# Patient Record
Sex: FEMALE | Race: WHITE
[De-identification: names, ages, dates, MRNs, and addresses within clinical notes are randomized per-mention and may not be internally consistent; named-entity substitution may affect disease eponyms.]

---

## 2021-08-23 ENCOUNTER — HOSPITAL ENCOUNTER (OUTPATIENT)
Dept: HOSPITAL 79 - ER1 | Age: 71
Setting detail: OBSERVATION
LOS: 3 days | Discharge: HOME | End: 2021-08-26
Attending: INTERNAL MEDICINE | Admitting: INTERNAL MEDICINE
Payer: MEDICARE

## 2021-08-23 VITALS — BODY MASS INDEX: 48.07 KG/M2 | WEIGHT: 261.25 LBS | HEIGHT: 62 IN

## 2021-08-23 DIAGNOSIS — Z79.899: ICD-10-CM

## 2021-08-23 DIAGNOSIS — I12.9: ICD-10-CM

## 2021-08-23 DIAGNOSIS — N30.00: ICD-10-CM

## 2021-08-23 DIAGNOSIS — R53.81: ICD-10-CM

## 2021-08-23 DIAGNOSIS — Z66: ICD-10-CM

## 2021-08-23 DIAGNOSIS — Z79.4: ICD-10-CM

## 2021-08-23 DIAGNOSIS — Z88.1: ICD-10-CM

## 2021-08-23 DIAGNOSIS — E78.5: ICD-10-CM

## 2021-08-23 DIAGNOSIS — E11.65: ICD-10-CM

## 2021-08-23 DIAGNOSIS — Z20.822: ICD-10-CM

## 2021-08-23 DIAGNOSIS — E66.01: ICD-10-CM

## 2021-08-23 DIAGNOSIS — E86.0: ICD-10-CM

## 2021-08-23 DIAGNOSIS — N17.9: Primary | ICD-10-CM

## 2021-08-23 DIAGNOSIS — E11.22: ICD-10-CM

## 2021-08-23 DIAGNOSIS — Z79.01: ICD-10-CM

## 2021-08-23 DIAGNOSIS — I48.0: ICD-10-CM

## 2021-08-23 DIAGNOSIS — E11.40: ICD-10-CM

## 2021-08-23 DIAGNOSIS — J45.909: ICD-10-CM

## 2021-08-23 DIAGNOSIS — E87.6: ICD-10-CM

## 2021-08-23 DIAGNOSIS — N18.30: ICD-10-CM

## 2021-08-23 LAB
HGB BLD-MCNC: 11.7 GM/DL (ref 12.3–15.3)
RED BLOOD COUNT: 4.05 M/UL (ref 4–5.1)
WHITE BLOOD COUNT: 11.8 K/UL (ref 4.5–11)

## 2021-08-23 PROCEDURE — U0002 COVID-19 LAB TEST NON-CDC: HCPCS

## 2021-08-23 PROCEDURE — G0378 HOSPITAL OBSERVATION PER HR: HCPCS

## 2021-08-24 LAB
BUN/CREATININE RATIO: 12 (ref 0–10)
BUN/CREATININE RATIO: 13 (ref 0–10)

## 2021-08-25 LAB
BUN/CREATININE RATIO: 13 (ref 0–10)
HGB BLD-MCNC: 9.7 GM/DL (ref 12.3–15.3)
RED BLOOD COUNT: 3.41 M/UL (ref 4–5.1)
WHITE BLOOD COUNT: 8 K/UL (ref 4.5–11)

## 2021-08-26 LAB
BUN/CREATININE RATIO: 14 (ref 0–10)
HGB BLD-MCNC: 10 GM/DL (ref 12.3–15.3)
RED BLOOD COUNT: 3.48 M/UL (ref 4–5.1)
WHITE BLOOD COUNT: 7.1 K/UL (ref 4.5–11)

## 2021-09-11 ENCOUNTER — HOSPITAL ENCOUNTER (INPATIENT)
Dept: HOSPITAL 79 - ER1 | Age: 71
LOS: 6 days | Discharge: HOME HEALTH | DRG: 674 | End: 2021-09-17
Attending: STUDENT IN AN ORGANIZED HEALTH CARE EDUCATION/TRAINING PROGRAM | Admitting: INTERNAL MEDICINE
Payer: MEDICARE

## 2021-09-11 VITALS — BODY MASS INDEX: 43.43 KG/M2 | WEIGHT: 236 LBS | HEIGHT: 62 IN

## 2021-09-11 DIAGNOSIS — B95.2: ICD-10-CM

## 2021-09-11 DIAGNOSIS — Z20.822: ICD-10-CM

## 2021-09-11 DIAGNOSIS — E11.40: ICD-10-CM

## 2021-09-11 DIAGNOSIS — E78.5: ICD-10-CM

## 2021-09-11 DIAGNOSIS — E46: ICD-10-CM

## 2021-09-11 DIAGNOSIS — K31.819: ICD-10-CM

## 2021-09-11 DIAGNOSIS — E11.22: ICD-10-CM

## 2021-09-11 DIAGNOSIS — Z82.49: ICD-10-CM

## 2021-09-11 DIAGNOSIS — Z80.8: ICD-10-CM

## 2021-09-11 DIAGNOSIS — Z98.51: ICD-10-CM

## 2021-09-11 DIAGNOSIS — N13.6: Primary | ICD-10-CM

## 2021-09-11 DIAGNOSIS — K59.00: ICD-10-CM

## 2021-09-11 DIAGNOSIS — Z98.890: ICD-10-CM

## 2021-09-11 DIAGNOSIS — N17.9: ICD-10-CM

## 2021-09-11 DIAGNOSIS — Z90.710: ICD-10-CM

## 2021-09-11 DIAGNOSIS — I48.0: ICD-10-CM

## 2021-09-11 DIAGNOSIS — Z79.01: ICD-10-CM

## 2021-09-11 DIAGNOSIS — D63.1: ICD-10-CM

## 2021-09-11 DIAGNOSIS — K22.4: ICD-10-CM

## 2021-09-11 DIAGNOSIS — E66.01: ICD-10-CM

## 2021-09-11 DIAGNOSIS — Z90.49: ICD-10-CM

## 2021-09-11 DIAGNOSIS — Z88.8: ICD-10-CM

## 2021-09-11 DIAGNOSIS — I12.9: ICD-10-CM

## 2021-09-11 DIAGNOSIS — Z83.3: ICD-10-CM

## 2021-09-11 DIAGNOSIS — K29.70: ICD-10-CM

## 2021-09-11 DIAGNOSIS — N18.30: ICD-10-CM

## 2021-09-11 DIAGNOSIS — K29.80: ICD-10-CM

## 2021-09-11 DIAGNOSIS — J45.909: ICD-10-CM

## 2021-09-11 DIAGNOSIS — B96.20: ICD-10-CM

## 2021-09-11 LAB
BUN/CREATININE RATIO: 16 (ref 0–10)
HGB BLD-MCNC: 11.3 GM/DL (ref 12.3–15.3)
RED BLOOD COUNT: 4.14 M/UL (ref 4–5.1)
WHITE BLOOD COUNT: 8.6 K/UL (ref 4.5–11)

## 2021-09-11 PROCEDURE — C9113 INJ PANTOPRAZOLE SODIUM, VIA: HCPCS

## 2021-09-11 PROCEDURE — U0002 COVID-19 LAB TEST NON-CDC: HCPCS

## 2021-09-12 LAB
BUN/CREATININE RATIO: 17 (ref 0–10)
HGB BLD-MCNC: 10.4 GM/DL (ref 12.3–15.3)
RED BLOOD COUNT: 3.82 M/UL (ref 4–5.1)
WHITE BLOOD COUNT: 6 K/UL (ref 4.5–11)

## 2021-09-13 LAB
BUN/CREATININE RATIO: 16 (ref 0–10)
HGB BLD-MCNC: 10.5 GM/DL (ref 12.3–15.3)
RED BLOOD COUNT: 3.79 M/UL (ref 4–5.1)
WHITE BLOOD COUNT: 5.5 K/UL (ref 4.5–11)

## 2021-09-14 LAB
BUN/CREATININE RATIO: 17 (ref 0–10)
HGB BLD-MCNC: 9.6 GM/DL (ref 12.3–15.3)
RED BLOOD COUNT: 3.51 M/UL (ref 4–5.1)
WHITE BLOOD COUNT: 5.2 K/UL (ref 4.5–11)

## 2021-09-15 LAB
BUN/CREATININE RATIO: 16 (ref 0–10)
HGB BLD-MCNC: 10.3 GM/DL (ref 12.3–15.3)
RED BLOOD COUNT: 3.78 M/UL (ref 4–5.1)
WHITE BLOOD COUNT: 5.1 K/UL (ref 4.5–11)

## 2021-09-16 LAB
BUN/CREATININE RATIO: 16 (ref 0–10)
HGB BLD-MCNC: 10.2 GM/DL (ref 12.3–15.3)
RED BLOOD COUNT: 3.74 M/UL (ref 4–5.1)
WHITE BLOOD COUNT: 5.4 K/UL (ref 4.5–11)

## 2021-09-16 PROCEDURE — 07BC3ZX EXCISION OF PELVIS LYMPHATIC, PERCUTANEOUS APPROACH, DIAGNOSTIC: ICD-10-PCS | Performed by: RADIOLOGY

## 2021-09-16 PROCEDURE — 0DB78ZX EXCISION OF STOMACH, PYLORUS, VIA NATURAL OR ARTIFICIAL OPENING ENDOSCOPIC, DIAGNOSTIC: ICD-10-PCS | Performed by: INTERNAL MEDICINE

## 2021-09-17 LAB
BUN/CREATININE RATIO: 15 (ref 0–10)
HGB BLD-MCNC: 10.4 GM/DL (ref 12.3–15.3)
RED BLOOD COUNT: 3.72 M/UL (ref 4–5.1)
WHITE BLOOD COUNT: 5.1 K/UL (ref 4.5–11)

## 2021-10-26 ENCOUNTER — OFFICE VISIT (OUTPATIENT)
Dept: UROLOGY | Facility: CLINIC | Age: 71
End: 2021-10-26

## 2021-10-26 VITALS — HEIGHT: 62 IN

## 2021-10-26 DIAGNOSIS — C54.1 ENDOMETRIAL CANCER DETERMINED BY UTERINE BIOPSY (HCC): ICD-10-CM

## 2021-10-26 DIAGNOSIS — Z87.440 HISTORY OF RECURRENT UTIS: ICD-10-CM

## 2021-10-26 DIAGNOSIS — R19.00 PELVIC MASS IN FEMALE: ICD-10-CM

## 2021-10-26 DIAGNOSIS — N13.30 HYDRONEPHROSIS OF LEFT KIDNEY: Primary | ICD-10-CM

## 2021-10-26 DIAGNOSIS — R10.9 CHRONIC LEFT FLANK PAIN: ICD-10-CM

## 2021-10-26 DIAGNOSIS — G89.29 CHRONIC LEFT FLANK PAIN: ICD-10-CM

## 2021-10-26 PROCEDURE — 99204 OFFICE O/P NEW MOD 45 MIN: CPT | Performed by: NURSE PRACTITIONER

## 2021-10-26 RX ORDER — METOPROLOL SUCCINATE 25 MG/1
25 TABLET, EXTENDED RELEASE ORAL DAILY
COMMUNITY

## 2021-10-26 RX ORDER — LOSARTAN POTASSIUM AND HYDROCHLOROTHIAZIDE 25; 100 MG/1; MG/1
1 TABLET ORAL DAILY
COMMUNITY
End: 2021-11-02

## 2021-10-26 RX ORDER — OXYBUTYNIN CHLORIDE 5 MG/1
5 TABLET ORAL 2 TIMES DAILY
COMMUNITY

## 2021-10-26 RX ORDER — MONTELUKAST SODIUM 10 MG/1
10 TABLET ORAL NIGHTLY
COMMUNITY

## 2021-10-26 RX ORDER — PANTOPRAZOLE SODIUM 40 MG/1
40 TABLET, DELAYED RELEASE ORAL DAILY
COMMUNITY

## 2021-10-26 RX ORDER — ROSUVASTATIN CALCIUM 20 MG/1
20 TABLET, COATED ORAL DAILY
COMMUNITY

## 2021-10-26 RX ORDER — FUROSEMIDE 20 MG/1
20 TABLET ORAL 2 TIMES DAILY
COMMUNITY
End: 2021-11-02

## 2021-10-26 RX ORDER — GENTAMICIN SULFATE 80 MG/100ML
80 INJECTION, SOLUTION INTRAVENOUS ONCE
Status: CANCELLED | OUTPATIENT
Start: 2021-11-04 | End: 2021-10-26

## 2021-10-26 RX ORDER — APIXABAN 5 MG/1
5 TABLET, FILM COATED ORAL 2 TIMES DAILY
COMMUNITY
Start: 2021-10-22

## 2021-10-26 NOTE — PROGRESS NOTES
Chief Complaint  LEFT HYDRONEPHROSIS /ENDOMETRIAL CANCER (New Patient WITH LEFT LATERAL PELVIC MASS CAUSEING HYDRONEPHROSI/NEEDS A LEFT STENT PLACED) and Nodule on kidney    Subjective          Delmy Bassett presents to [facility]  History of Present Illness     Ms. Delmy Bassett is a very pleasant but frail and ill-looking 71-year-old female with a significant history of recurrent Endometrial Cancer diagnosed 2 years ago, who presents to clinic today for evaluation.  She has been referred to us for left ureteral stent placement, due to severe left hydronephrosis, secondary to extrinsic compression of the ureter by an enlarged left iliac lymph node and a left periaortic node.    The patient is being followed by Dr. Zenaida Oseguera [facility], where she was recently hospitalized from O9/11/2021 through 09/14/2021 due to uncontrolled nausea vomiting generalized malaise,  during which a recent CT of abdomen on September 11 showed a new left lateral pelvic enlarged lymph miky mass,  resulting in compression of the ureter. She underwent CT-guided biopsy of her left lateral pelvic miky mass with pathology results currently pending.    On exam in clinic today, she is in apparent discomfort, she is weak but desirous of surgical intervention she states.  She reports she apparently had been reluctant for any aggressive treatment with chemotherapy in the past when she was first diagnosed 2 years ago.  Since its recurrence, she reports she has had multiple discussions with different options most likely radiation therapy if the tumor is more localized, or a hormonal pill, but she remains afraid she already has some lymph node involvement she states.  Nevertheless she is very hopeful with positives.    The patient is unable to give us any urine sample today.  However, she currently denies any urinary symptoms of frequency urgency or dysuria, she denies any burning on  "urination, but reports pelvic pressure and suprapubic comfort, she has lower abdominal pain, lower back pain, flank pain, she denies any CVA tenderness.  She denies any episodes of gross hematuria, and is currently on no antibiotics for her bladder.  PVR is 62 cc.    Objective   Vital Signs:   Ht 157.5 cm (62\")     Physical Exam  Constitutional:       General: She is in acute distress.      Appearance: She is well-developed. She is ill-appearing.   HENT:      Head: Normocephalic and atraumatic.   Eyes:      Pupils: Pupils are equal, round, and reactive to light.   Neck:      Thyroid: No thyromegaly.      Trachea: No tracheal deviation.   Cardiovascular:      Rate and Rhythm: Normal rate and regular rhythm.      Heart sounds: No murmur heard.      Pulmonary:      Effort: Pulmonary effort is normal. No respiratory distress.      Breath sounds: Normal breath sounds. No stridor. No wheezing.   Abdominal:      General: Bowel sounds are normal. There is distension.      Palpations: Abdomen is soft.      Tenderness: There is abdominal tenderness. There is guarding.   Genitourinary:     Labia:         Right: No tenderness.         Left: No tenderness.       Vagina: Normal. No vaginal discharge.      Comments: Pelvic exam deferred patient unable to tolerate  Musculoskeletal:         General: Tenderness present. No deformity. Normal range of motion.      Cervical back: Normal range of motion.   Skin:     General: Skin is warm and dry.      Capillary Refill: Capillary refill takes less than 2 seconds.      Coloration: Skin is pale.      Findings: No erythema or rash.   Neurological:      Mental Status: She is alert and oriented to person, place, and time.      Cranial Nerves: No cranial nerve deficit.      Sensory: No sensory deficit.      Coordination: Coordination normal.   Psychiatric:         Behavior: Behavior normal.         Thought Content: Thought content normal.         Judgment: Judgment normal.      Comments: " Pleasant with positive spirits        Result Review :                 Assessment and Plan    Diagnoses and all orders for this visit:    1. Hydronephrosis of left kidney (Primary)  -     COVID PRE-OP / PRE-PROCEDURE SCREENING ORDER (NO ISOLATION) - Swab, Nasopharynx; Future  -     Case Request; Standing  -     CBC (No Diff); Future  -     Basic Metabolic Panel; Future  -     Case Request    2. Endometrial cancer determined by uterine biopsy (HCC)  -     COVID PRE-OP / PRE-PROCEDURE SCREENING ORDER (NO ISOLATION) - Swab, Nasopharynx; Future  -     Case Request; Standing  -     CBC (No Diff); Future  -     Basic Metabolic Panel; Future  -     Case Request    3. Chronic left flank pain  -     COVID PRE-OP / PRE-PROCEDURE SCREENING ORDER (NO ISOLATION) - Swab, Nasopharynx; Future  -     Case Request; Standing  -     CBC (No Diff); Future  -     Basic Metabolic Panel; Future  -     Case Request    4. History of recurrent UTIs  -     COVID PRE-OP / PRE-PROCEDURE SCREENING ORDER (NO ISOLATION) - Swab, Nasopharynx; Future  -     Case Request; Standing  -     CBC (No Diff); Future  -     Basic Metabolic Panel; Future  -     Case Request    5. Pelvic mass in female    Other orders  -     Follow Anesthesia Guidelines / Protocol; Future  -     Obtain Informed Consent; Future  -     Provide NPO Instructions to Patient; Future  -     Chlorhexidine Skin Prep; Future          ASSESSMENT/PLAN  ENDOMETRIAL CA/ LEFT FLANK/LOWER BACK PAIN LEFT hydronephrosis and hydroureter: Patient presents to clinic today for evaluation of left hydronephrosis and possible placement of a left stent.  She had a CT-with guided biopsy at [facility] due to pelvic mass causing compression.     We both reviewed and discussed her CT scan reports , We discussed Hydronephrosis and hydroureter. I explained  Hydronephrosis as the swelling of a kidney due to a build-up of urine. It happens when urine cannot drain out from the kidney to the bladder  from a blockage or obstruction. Hydronephrosis can occur in one or both kidneys.      This can be caused by an anatomical defect in some cases or cancer that doesn't allow urine to drain properly, it could be a congenital blockage, a kidney stone, ETC. I explained hydroureter as a dilation of the ureter, and that both the presence of hydronephrosis or hydroureter can be physiologic or pathologic. It may be acute or chronic, unilateral or bilateral.     I discussed her plan of care with Dr. VIVAR and he is agreeable plan of care.      She has been scheduled for Cystoscopy, Ureteroscopy with LEFT STENT INSERTION procedure in OR on 11/4/2021.    She is to continue all medications as prescribed by PCP/oncology    She will continue have follow-up care with oncology as recommended.    Patient/daughter agreeable to plan of care.     Follow Up   Return in about 9 days (around 11/4/2021) for Next scheduled follow up IN OR WITH , DR VIVAR FOR LEFT URETERAL STENT INSERTION.     Patient was given instructions and counseling regarding her condition or for health maintenance advice. Please see specific information pulled into the AVS if appropriate.

## 2021-10-29 NOTE — PATIENT INSTRUCTIONS
Hydronephrosis    Hydronephrosis is the swelling of one or both kidneys due to a blockage that stops urine from flowing out of the body. Kidneys filter waste from the blood and produce urine. This condition can lead to kidney failure and may become life-threatening if not treated promptly.  What are the causes?  In infants and children, common causes include problems that occur when a baby is developing in the womb. These can include problems in the kidneys or in the tubes that drain urine into the bladder (ureters).  In adults, common causes include:  · Kidney stones.  · Pregnancy.  · A tumor or cyst in the abdomen or pelvis.  · An enlarged prostate gland.  Other causes include:  · Bladder infection.  · Scar tissue from a previous surgery or injury.  · A blood clot.  · Cancer of the prostate, bladder, uterus, ovary, or colon.  What are the signs or symptoms?  Symptoms of this condition include:  · Pain or discomfort in your side (flank) or abdomen.  · Swelling in your abdomen.  · Nausea and vomiting.  · Fever.  · Pain when passing urine.  · Feelings of urgency when you need to urinate.  · Urinating more often than normal.  In some cases, you may not have any symptoms.  How is this diagnosed?  This condition may be diagnosed based on:  · Your symptoms and medical history.  · A physical exam.  · Blood and urine tests.  · Imaging tests, such as an ultrasound, CT scan, or MRI.  · A procedure to look at your urinary tract and bladder by inserting a scope into the urethra (cystoscopy).  How is this treated?  Treatment for this condition depends on where the blockage is, how long it has been there, and what caused it. The goal of treatment is to remove the blockage. Treatment may include:  · Antibiotic medicines to treat or prevent infection.  · A procedure to place a small, thin tube (stent) into a blocked ureter. The stent will keep the ureter open so that urine can drain through it.  · A nonsurgical procedure that  crushes kidney stones with shock waves (extracorporeal shock wave lithotripsy).  · If kidney failure occurs, treatment may include dialysis or a kidney transplant.  Follow these instructions at home:    · Take over-the-counter and prescription medicines only as told by your health care provider.  · If you were prescribed an antibiotic medicine, take it exactly as told by your health care provider. Do not stop taking the antibiotic even if you start to feel better.  · Rest and return to your normal activities as told by your health care provider. Ask your health care provider what activities are safe for you.  · Drink enough fluid to keep your urine pale yellow.  · Keep all follow-up visits. This is important.  Contact a health care provider if:  · You continue to have symptoms after treatment.  · You develop new symptoms.  · Your urine becomes cloudy or bloody.  · You have a fever.  Get help right away if:  · You have severe flank or abdominal pain.  · You cannot drink fluids without vomiting.  Summary  · Hydronephrosis is the swelling of one or both kidneys due to a blockage that stops urine from flowing out of the body.  · Hydronephrosis can lead to kidney failure and may become life-threatening if not treated promptly.  · The goal of treatment is to remove the blockage. It may include a procedure to insert a stent into a blocked ureter, a procedure to break up kidney stones, or taking antibiotic medicines.  · Follow your health care provider's instructions for taking care of yourself at home, including instructions about drinking fluids, taking medicines, and limiting activities.  This information is not intended to replace advice given to you by your health care provider. Make sure you discuss any questions you have with your health care provider.  Document Revised: 04/06/2021 Document Reviewed: 04/06/2021  [employer] Patient Education © 2021 [employer]  Uterine Cancer    Uterine cancer is an abnormal growth of  cancer tissue in the womb (uterus). Uterine cancer can spread to other parts of the body. Uterine cancer usually occurs after a woman stops having menstrual periods (menopause). However, it may also occur around the time that menopause begins.  The wall of the uterus has an inner layer of tissue called the endometrium and an outer layer of muscle tissue called the myometrium. The most common type of uterine cancer is endometrial cancer, and it begins in the endometrium. Cancer that begins in the myometrium (uterine sarcoma) is very rare.  What are the causes?  The exact cause of this condition is not known.  What increases the risk?  You are more likely to develop this condition if you:  · Are older than 50.  · Have an endometrium that is thicker than normal (endometrial hyperplasia).  · Have never been pregnant or cannot have children.  · Started menstruating when you were younger than 12 years old or are older than 52 and are still having menstrual periods.  · Have a history of cancer, including:  ? Cancer of the ovaries, or cancer of the intestines, colon, or rectum (colorectal cancer).  ? A family history of uterine cancer or hereditary nonpolyposis colon cancer (HNPCC).  · Have a long-term (chronic) condition, such as:  ? Diabetes.  ? High blood pressure.  ? Thyroid disease.  ? Gallbladder disease.  ? Obesity.  ? A personal history of enlarged ovaries with small cysts (polycystic ovarian syndrome).  · Have been exposed to:  ? Radiation.  ? Cigarette smoke.  ? Hormone therapy or a medicine called tamoxifen.  What are the signs or symptoms?  Symptoms of this condition include:  · Abnormal vaginal bleeding or discharge. Bleeding may start as a watery, blood-streaked flow that gradually contains more blood. This is the most common symptom. If you experience abnormal vaginal bleeding, do not assume that it is part of menopause.  · Vaginal bleeding after menopause.  · Unexplained weight loss.  · Bleeding between  periods.  · Urination that is difficult, painful, or more frequent than usual.  · A lump in the vagina.  · Pain, bloating, or fullness in the abdomen, or pain in the pelvic area. You may also have pain during sex.  How is this diagnosed?  This condition may be diagnosed based on your medical history, symptoms, a physical exam, and a pelvic exam. During the pelvic exam, your health care provider will feel your pelvis for any growths, as well as for any enlarged lymph nodes. You may also have tests, including:  · Blood and urine tests.  · Imaging tests, such as X-rays, CT scans, ultrasound, or MRIs.  · A procedure in which a thin, flexible tube with a light and camera on the end is inserted through the vagina and used to look inside the uterus (hysteroscopy).  · A Pap test to check for abnormal cells in the lower part of the uterus (cervix) and the upper vagina.  · Removal of a tissue sample (biopsy) from the uterine lining. The sample will be examined in a lab to check for cancer cells.  · Dilation and curettage (D&C). This procedure involves stretching the cervix and scraping the inside lining of the uterus to get a tissue sample to check for cancer cells.  The cancer will be staged to determine its severity and extent. Staging is an assessment of the size of the tumor, whether the cancer has spread, and where the cancer has spread.  The stages of uterine cancer are as follows:  · Stage I. The cancer is found only in the uterus.  · Stage II. The cancer has spread to the cervix.  · Stage III. The cancer has spread outside the uterus, but not outside the pelvis. The cancer may have spread to the lymph nodes in the pelvis. Lymph nodes are part of your body's disease-fighting (immune) system. Lymph nodes are found in many locations in your body, including the neck, underarm, and groin.  · Stage IV. The cancer has spread to other parts of the body, such as the bladder or rectum.  How is this treated?  This condition is  often treated with surgery to remove:  · The uterus, cervix, fallopian tubes, and ovaries (total hysterectomy).  · The uterus and cervix (simple hysterectomy).  The type of hysterectomy you will have depends on the extent of your cancer. Lymph nodes near the uterus may also be removed in some cases. Treatment may also include one or more of the following:  · Chemotherapy. These are medicines to kill cancer cells or to slow their growth.  · Radiation therapy. This uses high-energy rays to kill the cancer cells and prevent the spread of cancer.  · Chemoradiation. This is a combination treatment that alternates chemotherapy with radiation treatments to enhance the way radiation works.  · Brachytherapy. This involves placing small radioactive capsules inside the body where the cancer was removed.  · Hormone therapy. This includes taking medicines that lower the levels of estrogen in the body.  Follow these instructions at home:  Medicines  · Take over-the-counter and prescription medicines only as told by your health care provider.  · Ask your health care provider if the medicine prescribed to you requires you to avoid driving or using machinery.  Activity  · Return to your normal activities as told by your health care provider. Ask your health care provider what activities are safe for you.  · Get regular exercise. Aim for 30 minutes of moderate-intensity activity 5 times a week. Examples of moderate-intensity activity include walking and yoga. Be sure to talk with your health care provider before starting any exercise routine.  Lifestyle  · Eat a healthy diet. A healthy diet includes a lot of fruits and vegetables, low-fat dairy products, lean meats, and fiber.  · Do not use any products that contain nicotine or tobacco, such as cigarettes, e-cigarettes, and chewing tobacco. If you need help quitting, ask your health care provider.  · Consider joining a support group to help you cope with stress. Your health care  provider may be able to recommend a local or online support group.  General instructions  · Drink enough fluid to keep your urine pale yellow.  · Do not have sex, douche, or place anything in your vagina, such as a tampon or diaphragm, until your health care provider says that it is safe to do this.  · Work with your health care provider to:  ? Manage any chronic conditions you have, such as diabetes, high blood pressure, thyroid disease, or gallbladder disease.  ? Manage any side effects of your treatment.  · Keep all follow-up visits as told by your health care provider. This is important.  Where to find more information  · American Cancer Society: www.cancer.org  · National Cancer [place]: www.cancer.gov  Contact a health care provider if you:  · Have pain in your pelvis or abdomen that gets worse.  · Cannot urinate.  · Have abnormal bleeding.  · Have a fever.  Get help right away if:  · You develop sudden or new severe symptoms, such as:  ? Heavy bleeding.  ? Severe weakness.  ? Pain that is severe or does not get better with medicine.  Summary  · Uterine cancer is an abnormal growth of cancer tissue in the uterus. The most common type of uterine cancer begins in the endometrium and is called endometrial cancer.  · This condition is often treated with surgery to remove the uterus, cervix, fallopian tubes, and ovaries (total hysterectomy) or the uterus and cervix (simple hysterectomy).  · Work with your health care provider to manage any long-term (chronic) conditions you have, such as diabetes, high blood pressure, thyroid disease, or gallbladder disease.  · Consider joining a support group to help you cope with stress. Your health care provider may be able to recommend a local or online support group.  This information is not intended to replace advice given to you by your health care provider. Make sure you discuss any questions you have with your health care provider.  Document Revised: 10/26/2020 Document  Reviewed: 10/26/2020  [employer] Patient Education © 2021 [employer]  Endometrial Hyperplasia  Endometrial hyperplasia is abnormal thickening of the tissue that lines the inside of the uterus (endometrium). This condition can also cause cell changes called atypiathat can lead to endometrial cancer. The types of endometrial hyperplasia include:  · Simple hyperplasia. This is endometrial thickening only.  · Complex hyperplasia. This is endometrial thickening and crowding of cells.  · Simple atypical hyperplasia. This is endometrial thickening and cell changes. This type has a low risk of becoming cancerous.  · Complex atypical hyperplasia. This is endometrial thickening, cell crowding, and cell changes. This type has a higher risk of becoming cancerous.  Early diagnosis and treatment are important to reduce the risk of cancer.  What are the causes?  This condition is caused by an imbalance of the reproductive hormones estrogen and progesterone. This condition results from too much estrogen and not enough progesterone in the body.  What increases the risk?  The following factors may make you more likely to develop this condition:  · Having recently experienced menopause. The condition is most common just after menopause. Menopause means 12 months without a menstrual period.  · Having medical conditions, such as:  ? Polycystic ovary syndrome (PCOS).  ? Diabetes.  ? Being overweight.  · Taking an estrogen medicine or a medicine that acts like estrogen.  · Having started periods early or menopause late.  · Never having been pregnant.  · Having a family history of uterine, ovarian, or colon cancer.  What are the signs or symptoms?  The main symptom of this condition is abnormal vaginal bleeding. The bleeding may:  · Be heavier and longer during menstrual periods.  · Happen more often than a normal menstrual cycle.  · Occur after menopause.  Some people experience no symptoms.  How is this diagnosed?  This condition may be  diagnosed based on:  · Your symptoms and medical history, including having risk factors for the condition.  · A physical exam.  · Tests, such as:  ? Transvaginal ultrasound. This is an imaging study done with sound waves to check the endometrium. This test uses a sound-wave probe that is inserted into the vagina.  ? Endometrial biopsy, which is a procedure to take a sample of endometrial tissue through the vagina so it can be looked at under a microscope.  ? Dilation and curettage. During this procedure, tissue is scraped or suctioned from the inside of the uterus so it can be looked at under a microscope.  ? Hysteroscopy. This is a procedure in which a thin, lighted device is inserted into the uterus through the cervix to see inside the uterus.  How is this treated?  Treatment for this condition depends on the type of hyperplasia that you have. Synthetic progesterone (progestin) is the most common treatment, and can be given as a pill, injection, or vaginal cream. It can also be given as an intrauterine device (IUD) that is implanted in your uterus.  If you have hyperplasia with atypia, you may need a hysterectomy, which is surgery to remove your uterus. You may be more likely to have this procedure if:  · You have complex atypical hyperplasia.  · You are past menopause.  · You do not want to become pregnant.  Follow these instructions at home:  · Take over-the-counter and prescription medicines only as told by your health care provider.  · Lose weight if you are overweight. Ask your health care provider to recommend a diet and exercise program to help you reach and maintain a healthy weight.  · Keep all follow-up visits as told by your health care provider. This is important.  Contact a health care provider if:  · You have periods that are heavier or last longer than usual.  · You have periods more often than usual.  · You have vaginal bleeding after menopause.  Get help right away if:  · You have vaginal bleeding  that is so heavy that you need to change your tampon or pad after less than 2 hours.  · You pass blood clots that are larger than the size of a quarter.  Summary  · Endometrial hyperplasia is abnormal thickening of the tissue that lines the inside of the uterus (endometrium).  · The main symptom of this condition is abnormal vaginal bleeding. This may be heavier, longer, or more-frequent periods, or it may be vaginal bleeding after menopause.  · Some types of hyperplasia also cause cell changes called atypia that can lead to cancer.  · Always let your health care provider know about abnormal vaginal bleeding.  · Early diagnosis and treatment are important to reduce the risk of cancer.  This information is not intended to replace advice given to you by your health care provider. Make sure you discuss any questions you have with your health care provider.  Document Revised: 01/25/2021 Document Reviewed: 01/25/2021  [employer] Patient Education © 2021 [employer]

## 2021-11-02 ENCOUNTER — PRE-ADMISSION TESTING (OUTPATIENT)
Dept: PREADMISSION TESTING | Facility: HOSPITAL | Age: 71
End: 2021-11-02

## 2021-11-02 ENCOUNTER — LAB (OUTPATIENT)
Dept: LAB | Facility: HOSPITAL | Age: 71
End: 2021-11-02

## 2021-11-02 DIAGNOSIS — N13.30 HYDRONEPHROSIS OF LEFT KIDNEY: ICD-10-CM

## 2021-11-02 DIAGNOSIS — G89.29 CHRONIC LEFT FLANK PAIN: ICD-10-CM

## 2021-11-02 DIAGNOSIS — R10.9 CHRONIC LEFT FLANK PAIN: ICD-10-CM

## 2021-11-02 DIAGNOSIS — C54.1 ENDOMETRIAL CANCER DETERMINED BY UTERINE BIOPSY (HCC): ICD-10-CM

## 2021-11-02 DIAGNOSIS — Z87.440 HISTORY OF RECURRENT UTIS: ICD-10-CM

## 2021-11-02 LAB
ANION GAP SERPL CALCULATED.3IONS-SCNC: 17 MMOL/L (ref 5–15)
BUN SERPL-MCNC: 50 MG/DL (ref 8–23)
BUN/CREAT SERPL: 25.5 (ref 7–25)
CALCIUM SPEC-SCNC: 9.5 MG/DL (ref 8.6–10.5)
CHLORIDE SERPL-SCNC: 96 MMOL/L (ref 98–107)
CO2 SERPL-SCNC: 17 MMOL/L (ref 22–29)
CREAT SERPL-MCNC: 1.96 MG/DL (ref 0.57–1)
DEPRECATED RDW RBC AUTO: 49.8 FL (ref 37–54)
ERYTHROCYTE [DISTWIDTH] IN BLOOD BY AUTOMATED COUNT: 15.5 % (ref 12.3–15.4)
GFR SERPL CREATININE-BSD FRML MDRD: 25 ML/MIN/1.73
GLUCOSE SERPL-MCNC: 435 MG/DL (ref 65–99)
HCT VFR BLD AUTO: 41.2 % (ref 34–46.6)
HGB BLD-MCNC: 13.1 G/DL (ref 12–15.9)
MCH RBC QN AUTO: 27.8 PG (ref 26.6–33)
MCHC RBC AUTO-ENTMCNC: 31.8 G/DL (ref 31.5–35.7)
MCV RBC AUTO: 87.5 FL (ref 79–97)
PLATELET # BLD AUTO: 342 10*3/MM3 (ref 140–450)
PMV BLD AUTO: 11.3 FL (ref 6–12)
POTASSIUM SERPL-SCNC: 5.5 MMOL/L (ref 3.5–5.2)
RBC # BLD AUTO: 4.71 10*6/MM3 (ref 3.77–5.28)
SODIUM SERPL-SCNC: 130 MMOL/L (ref 136–145)
WBC # BLD AUTO: 8.08 10*3/MM3 (ref 3.4–10.8)

## 2021-11-02 PROCEDURE — U0005 INFEC AGEN DETEC AMPLI PROBE: HCPCS | Performed by: NURSE PRACTITIONER

## 2021-11-02 PROCEDURE — 80048 BASIC METABOLIC PNL TOTAL CA: CPT

## 2021-11-02 PROCEDURE — 85027 COMPLETE CBC AUTOMATED: CPT

## 2021-11-02 PROCEDURE — U0004 COV-19 TEST NON-CDC HGH THRU: HCPCS | Performed by: NURSE PRACTITIONER

## 2021-11-02 PROCEDURE — 36415 COLL VENOUS BLD VENIPUNCTURE: CPT

## 2021-11-02 PROCEDURE — C9803 HOPD COVID-19 SPEC COLLECT: HCPCS | Performed by: NURSE PRACTITIONER

## 2021-11-02 RX ORDER — MEGESTROL ACETATE 40 MG/1
40 TABLET ORAL DAILY
COMMUNITY

## 2021-11-02 NOTE — DISCHARGE INSTRUCTIONS
TAKE the following medications the morning of surgery:    All heart or blood pressure medications    Please discontinue all blood thinners and anticoagulants (except aspirin) prior to surgery as per your surgeon and cardiologist instructions.  Aspirin may be continued up to the day prior to surgery.    HOLD all diabetic medications the morning of surgery as ordered.    Arrival time for surgery on 11/4/21 will be given to you by Dr. Leung's office.    A RESPONSIBLE PERSON MUST REMAIN IN THE WAITING ROOM DURING YOUR PROCEDURE AND A RESPONSIBLE  MUST BE AVAILABLE UPON YOUR DISCHARGE.    General Instructions:  • Do NOT eat or drink after midnight 11/3/21 which includes water, mints, or gum.  • You may brush your teeth. Dental appliances that are removable must be taken out day of surgery.  • Do NOT smoke, chew tobacco, or drink alcohol within 24 hours prior to surgery.  • Bring medications in original bottles, any inhalers and if applicable your C-PAP/BI-PAP machine  • Bring any papers given to you in the doctor’s office  • Wear clean, comfortable clothes and socks  • Do NOT wear contact lenses or make-up or dark nail polish.  Bring a case for your glasses if applicable.  • Bring crutches or walker if applicable  • Leave all other valuables and jewelry at home  • If you were given a blood bank armband, continue to wear it until discharged.    Preventing a Surgical Site Infection:  • Shower the night before surgery (unless instructed otherwise) using a fresh bar of anti-bacterial soap (such as Dial) and clean washcloth.  Dry with a clean towel and dress in clean clothing.  • For 2 to 3 days before surgery, avoid shaving with a razor near where you will have surgery because the razor can irritate skin and make it easier to develop an infection.  Ask your surgeon if you will be receiving antibiotics prior to surgery.  • Make sure you, your family, and all healthcare providers clean their hands with soap and water  or an alcohol-based hand  before caring for you or your wound.  • If at all possible, quit smoking as many days before surgery as you can.    Day of Surgery:  Upon arrival, a pre-op nurse and anesthesiologist will review your health history, obtain vital signs, and answer questions you may have.  The only belongings needed at this time will be your home medications and if applicable you C-PAP/BI-PAP machine.  If you are staying overnight, your family can leave the rest of your belongings in the car and bring them to your room later.  A pre-op nurse will start an IV and you may receive medication in preparation for surgery.  Due to patient privacy and limited space, only one member of your family will be able to accompany you in the pre-op area.  While you are in surgery your family should notify the waiting room  if they leave the waiting room area and provide a contact number.  Please be aware that surgery does come with discomfort.  We want to make every effort to control your discomfort so please discuss any uncontrolled symptoms with your nurse.  Your doctor will most likely have prescribed pain medications.  If you are going home after surgery you will receive individualized written care instructions before being discharged.  A responsible adult must drive you to and from the hospital on the day of surgery and stay with you for 24 hours.  If you are staying overnight following surgery, you will be transported to your hospital room following the recovery period.

## 2021-11-03 LAB — SARS-COV-2 RNA PNL SPEC NAA+PROBE: NOT DETECTED

## 2021-11-04 ENCOUNTER — ANESTHESIA EVENT (OUTPATIENT)
Dept: PERIOP | Facility: HOSPITAL | Age: 71
End: 2021-11-04

## 2021-11-04 ENCOUNTER — ANESTHESIA (OUTPATIENT)
Dept: PERIOP | Facility: HOSPITAL | Age: 71
End: 2021-11-04

## 2021-11-04 ENCOUNTER — APPOINTMENT (OUTPATIENT)
Dept: GENERAL RADIOLOGY | Facility: HOSPITAL | Age: 71
End: 2021-11-04

## 2021-11-04 ENCOUNTER — HOSPITAL ENCOUNTER (OUTPATIENT)
Facility: HOSPITAL | Age: 71
Setting detail: HOSPITAL OUTPATIENT SURGERY
Discharge: HOME | End: 2021-11-04
Attending: UROLOGY | Admitting: UROLOGY

## 2021-11-04 VITALS
BODY MASS INDEX: 40.12 KG/M2 | TEMPERATURE: 97.3 F | HEIGHT: 62 IN | OXYGEN SATURATION: 98 % | SYSTOLIC BLOOD PRESSURE: 130 MMHG | WEIGHT: 218 LBS | RESPIRATION RATE: 15 BRPM | HEART RATE: 89 BPM | DIASTOLIC BLOOD PRESSURE: 65 MMHG

## 2021-11-04 DIAGNOSIS — G89.29 CHRONIC LEFT FLANK PAIN: ICD-10-CM

## 2021-11-04 DIAGNOSIS — Z87.440 HISTORY OF RECURRENT UTIS: ICD-10-CM

## 2021-11-04 DIAGNOSIS — N13.30 HYDRONEPHROSIS OF LEFT KIDNEY: ICD-10-CM

## 2021-11-04 DIAGNOSIS — R10.9 CHRONIC LEFT FLANK PAIN: ICD-10-CM

## 2021-11-04 DIAGNOSIS — C54.1 ENDOMETRIAL CANCER DETERMINED BY UTERINE BIOPSY (HCC): ICD-10-CM

## 2021-11-04 LAB
BACTERIA UR QL AUTO: ABNORMAL /HPF
BILIRUB UR QL STRIP: ABNORMAL
CLARITY UR: ABNORMAL
COLOR UR: ABNORMAL
GLUCOSE BLDC GLUCOMTR-MCNC: 271 MG/DL (ref 70–130)
GLUCOSE UR STRIP-MCNC: ABNORMAL MG/DL
HGB UR QL STRIP.AUTO: ABNORMAL
HYALINE CASTS UR QL AUTO: ABNORMAL /LPF
KETONES UR QL STRIP: ABNORMAL
LEUKOCYTE ESTERASE UR QL STRIP.AUTO: ABNORMAL
NITRITE UR QL STRIP: NEGATIVE
PH UR STRIP.AUTO: <=5 [PH] (ref 5–8)
POTASSIUM SERPL-SCNC: 5.1 MMOL/L (ref 3.5–5.2)
PROT UR QL STRIP: ABNORMAL
RBC # UR: ABNORMAL /HPF
REF LAB TEST METHOD: ABNORMAL
SP GR UR STRIP: 1.02 (ref 1–1.03)
SQUAMOUS #/AREA URNS HPF: ABNORMAL /HPF
UROBILINOGEN UR QL STRIP: ABNORMAL
WBC UR QL AUTO: ABNORMAL /HPF
YEAST URNS QL MICRO: ABNORMAL /HPF

## 2021-11-04 PROCEDURE — 81001 URINALYSIS AUTO W/SCOPE: CPT | Performed by: UROLOGY

## 2021-11-04 PROCEDURE — 25010000002 FLUCONAZOLE PER 200 MG: Performed by: UROLOGY

## 2021-11-04 PROCEDURE — 25010000002 PROPOFOL 10 MG/ML EMULSION: Performed by: NURSE ANESTHETIST, CERTIFIED REGISTERED

## 2021-11-04 PROCEDURE — 25010000002 GENTAMICIN PER 80 MG: Performed by: NURSE PRACTITIONER

## 2021-11-04 PROCEDURE — 87186 SC STD MICRODIL/AGAR DIL: CPT | Performed by: UROLOGY

## 2021-11-04 PROCEDURE — C2617 STENT, NON-COR, TEM W/O DEL: HCPCS | Performed by: UROLOGY

## 2021-11-04 PROCEDURE — 52351 CYSTOURETERO & OR PYELOSCOPE: CPT | Performed by: UROLOGY

## 2021-11-04 PROCEDURE — 82962 GLUCOSE BLOOD TEST: CPT

## 2021-11-04 PROCEDURE — 52332 CYSTOSCOPY AND TREATMENT: CPT | Performed by: UROLOGY

## 2021-11-04 PROCEDURE — 87086 URINE CULTURE/COLONY COUNT: CPT | Performed by: UROLOGY

## 2021-11-04 PROCEDURE — 76000 FLUOROSCOPY <1 HR PHYS/QHP: CPT

## 2021-11-04 PROCEDURE — 87077 CULTURE AEROBIC IDENTIFY: CPT | Performed by: UROLOGY

## 2021-11-04 PROCEDURE — 76000 FLUOROSCOPY <1 HR PHYS/QHP: CPT | Performed by: RADIOLOGY

## 2021-11-04 PROCEDURE — 25010000002 ONDANSETRON PER 1 MG: Performed by: NURSE ANESTHETIST, CERTIFIED REGISTERED

## 2021-11-04 PROCEDURE — C1769 GUIDE WIRE: HCPCS | Performed by: UROLOGY

## 2021-11-04 PROCEDURE — 25010000002 FENTANYL CITRATE (PF) 50 MCG/ML SOLUTION: Performed by: NURSE ANESTHETIST, CERTIFIED REGISTERED

## 2021-11-04 PROCEDURE — 84132 ASSAY OF SERUM POTASSIUM: CPT | Performed by: ANESTHESIOLOGY

## 2021-11-04 DEVICE — URETERAL STENT
Type: IMPLANTABLE DEVICE | Site: URETER | Status: FUNCTIONAL
Brand: POLARIS™ ULTRA

## 2021-11-04 RX ORDER — KETOROLAC TROMETHAMINE 30 MG/ML
15 INJECTION, SOLUTION INTRAMUSCULAR; INTRAVENOUS EVERY 6 HOURS PRN
Status: DISCONTINUED | OUTPATIENT
Start: 2021-11-04 | End: 2021-11-04 | Stop reason: HOSPADM

## 2021-11-04 RX ORDER — LIDOCAINE HYDROCHLORIDE 20 MG/ML
INJECTION, SOLUTION INFILTRATION; PERINEURAL AS NEEDED
Status: DISCONTINUED | OUTPATIENT
Start: 2021-11-04 | End: 2021-11-04 | Stop reason: SURG

## 2021-11-04 RX ORDER — FLUCONAZOLE 100 MG/1
100 TABLET ORAL DAILY
Qty: 14 TABLET | Refills: 0 | Status: SHIPPED | OUTPATIENT
Start: 2021-11-04 | End: 2021-11-18

## 2021-11-04 RX ORDER — FENTANYL CITRATE 50 UG/ML
INJECTION, SOLUTION INTRAMUSCULAR; INTRAVENOUS AS NEEDED
Status: DISCONTINUED | OUTPATIENT
Start: 2021-11-04 | End: 2021-11-04 | Stop reason: SURG

## 2021-11-04 RX ORDER — DROPERIDOL 2.5 MG/ML
0.62 INJECTION, SOLUTION INTRAMUSCULAR; INTRAVENOUS ONCE AS NEEDED
Status: DISCONTINUED | OUTPATIENT
Start: 2021-11-04 | End: 2021-11-04 | Stop reason: HOSPADM

## 2021-11-04 RX ORDER — MIDAZOLAM HYDROCHLORIDE 1 MG/ML
0.5 INJECTION INTRAMUSCULAR; INTRAVENOUS
Status: DISCONTINUED | OUTPATIENT
Start: 2021-11-04 | End: 2021-11-04 | Stop reason: HOSPADM

## 2021-11-04 RX ORDER — OXYCODONE HYDROCHLORIDE AND ACETAMINOPHEN 5; 325 MG/1; MG/1
1 TABLET ORAL ONCE AS NEEDED
Status: DISCONTINUED | OUTPATIENT
Start: 2021-11-04 | End: 2021-11-04 | Stop reason: HOSPADM

## 2021-11-04 RX ORDER — ONDANSETRON 2 MG/ML
INJECTION INTRAMUSCULAR; INTRAVENOUS AS NEEDED
Status: DISCONTINUED | OUTPATIENT
Start: 2021-11-04 | End: 2021-11-04 | Stop reason: SURG

## 2021-11-04 RX ORDER — MEPERIDINE HYDROCHLORIDE 25 MG/ML
12.5 INJECTION INTRAMUSCULAR; INTRAVENOUS; SUBCUTANEOUS
Status: DISCONTINUED | OUTPATIENT
Start: 2021-11-04 | End: 2021-11-04 | Stop reason: HOSPADM

## 2021-11-04 RX ORDER — FAMOTIDINE 10 MG/ML
INJECTION, SOLUTION INTRAVENOUS AS NEEDED
Status: DISCONTINUED | OUTPATIENT
Start: 2021-11-04 | End: 2021-11-04 | Stop reason: SURG

## 2021-11-04 RX ORDER — PROPOFOL 10 MG/ML
VIAL (ML) INTRAVENOUS AS NEEDED
Status: DISCONTINUED | OUTPATIENT
Start: 2021-11-04 | End: 2021-11-04 | Stop reason: SURG

## 2021-11-04 RX ORDER — SODIUM CHLORIDE 0.9 % (FLUSH) 0.9 %
10 SYRINGE (ML) INJECTION EVERY 12 HOURS SCHEDULED
Status: DISCONTINUED | OUTPATIENT
Start: 2021-11-04 | End: 2021-11-04 | Stop reason: HOSPADM

## 2021-11-04 RX ORDER — GENTAMICIN SULFATE 80 MG/100ML
80 INJECTION, SOLUTION INTRAVENOUS ONCE
Status: COMPLETED | OUTPATIENT
Start: 2021-11-04 | End: 2021-11-04

## 2021-11-04 RX ORDER — ATROPA BELLADONNA AND OPIUM 16.2; 3 MG/1; MG/1
SUPPOSITORY RECTAL AS NEEDED
Status: DISCONTINUED | OUTPATIENT
Start: 2021-11-04 | End: 2021-11-04 | Stop reason: HOSPADM

## 2021-11-04 RX ORDER — PHENAZOPYRIDINE HYDROCHLORIDE 100 MG/1
100 TABLET, FILM COATED ORAL 3 TIMES DAILY PRN
Qty: 6 TABLET | Refills: 0 | Status: SHIPPED | OUTPATIENT
Start: 2021-11-04

## 2021-11-04 RX ORDER — SODIUM CHLORIDE, SODIUM LACTATE, POTASSIUM CHLORIDE, CALCIUM CHLORIDE 600; 310; 30; 20 MG/100ML; MG/100ML; MG/100ML; MG/100ML
100 INJECTION, SOLUTION INTRAVENOUS ONCE AS NEEDED
Status: DISCONTINUED | OUTPATIENT
Start: 2021-11-04 | End: 2021-11-04 | Stop reason: HOSPADM

## 2021-11-04 RX ORDER — SODIUM CHLORIDE 0.9 % (FLUSH) 0.9 %
10 SYRINGE (ML) INJECTION AS NEEDED
Status: DISCONTINUED | OUTPATIENT
Start: 2021-11-04 | End: 2021-11-04 | Stop reason: HOSPADM

## 2021-11-04 RX ORDER — ONDANSETRON 2 MG/ML
4 INJECTION INTRAMUSCULAR; INTRAVENOUS AS NEEDED
Status: DISCONTINUED | OUTPATIENT
Start: 2021-11-04 | End: 2021-11-04 | Stop reason: HOSPADM

## 2021-11-04 RX ORDER — MAGNESIUM HYDROXIDE 1200 MG/15ML
LIQUID ORAL AS NEEDED
Status: DISCONTINUED | OUTPATIENT
Start: 2021-11-04 | End: 2021-11-04 | Stop reason: HOSPADM

## 2021-11-04 RX ORDER — FENTANYL CITRATE 50 UG/ML
50 INJECTION, SOLUTION INTRAMUSCULAR; INTRAVENOUS
Status: DISCONTINUED | OUTPATIENT
Start: 2021-11-04 | End: 2021-11-04 | Stop reason: HOSPADM

## 2021-11-04 RX ORDER — IPRATROPIUM BROMIDE AND ALBUTEROL SULFATE 2.5; .5 MG/3ML; MG/3ML
3 SOLUTION RESPIRATORY (INHALATION) ONCE AS NEEDED
Status: DISCONTINUED | OUTPATIENT
Start: 2021-11-04 | End: 2021-11-04 | Stop reason: HOSPADM

## 2021-11-04 RX ORDER — SODIUM CHLORIDE, SODIUM LACTATE, POTASSIUM CHLORIDE, CALCIUM CHLORIDE 600; 310; 30; 20 MG/100ML; MG/100ML; MG/100ML; MG/100ML
INJECTION, SOLUTION INTRAVENOUS CONTINUOUS PRN
Status: DISCONTINUED | OUTPATIENT
Start: 2021-11-04 | End: 2021-11-04 | Stop reason: SURG

## 2021-11-04 RX ORDER — FLUCONAZOLE 2 MG/ML
200 INJECTION, SOLUTION INTRAVENOUS ONCE
Status: COMPLETED | OUTPATIENT
Start: 2021-11-04 | End: 2021-11-04

## 2021-11-04 RX ORDER — SODIUM CHLORIDE, SODIUM LACTATE, POTASSIUM CHLORIDE, CALCIUM CHLORIDE 600; 310; 30; 20 MG/100ML; MG/100ML; MG/100ML; MG/100ML
125 INJECTION, SOLUTION INTRAVENOUS ONCE
Status: COMPLETED | OUTPATIENT
Start: 2021-11-04 | End: 2021-11-04

## 2021-11-04 RX ADMIN — FENTANYL CITRATE 100 MCG: 50 INJECTION INTRAMUSCULAR; INTRAVENOUS at 11:38

## 2021-11-04 RX ADMIN — FLUCONAZOLE 200 MG: 2 INJECTION INTRAVENOUS at 11:49

## 2021-11-04 RX ADMIN — PROPOFOL 100 MG: 10 INJECTION, EMULSION INTRAVENOUS at 11:46

## 2021-11-04 RX ADMIN — GENTAMICIN SULFATE 80 MG: 80 INJECTION, SOLUTION INTRAVENOUS at 11:38

## 2021-11-04 RX ADMIN — ONDANSETRON 4 MG: 2 INJECTION INTRAMUSCULAR; INTRAVENOUS at 11:38

## 2021-11-04 RX ADMIN — SODIUM CHLORIDE, POTASSIUM CHLORIDE, SODIUM LACTATE AND CALCIUM CHLORIDE: 600; 310; 30; 20 INJECTION, SOLUTION INTRAVENOUS at 11:46

## 2021-11-04 RX ADMIN — LIDOCAINE HYDROCHLORIDE 60 MG: 20 INJECTION, SOLUTION INFILTRATION; PERINEURAL at 11:46

## 2021-11-04 RX ADMIN — FAMOTIDINE 20 MG: 10 INJECTION INTRAVENOUS at 11:38

## 2021-11-04 RX ADMIN — SODIUM CHLORIDE, POTASSIUM CHLORIDE, SODIUM LACTATE AND CALCIUM CHLORIDE 125 ML/HR: 600; 310; 30; 20 INJECTION, SOLUTION INTRAVENOUS at 10:25

## 2021-11-04 NOTE — ANESTHESIA POSTPROCEDURE EVALUATION
Patient: Delmy Bassett    Procedure Summary     Date: 11/04/21 Room / Location: [facility] OR  / [facility] OR    Anesthesia Start: 1138 Anesthesia Stop: 1217    Procedure: CYSTOSCOPY URETERAL CATHETER/STENT INSERTION (Left ) Diagnosis:       Hydronephrosis of left kidney      Endometrial cancer determined by uterine biopsy ([facility])      Chronic left flank pain      History of recurrent UTIs      (Hydronephrosis of left kidney [N13.30])      (Endometrial cancer determined by uterine biopsy ([facility]) [C54.1])      (Chronic left flank pain [R10.9, G89.29])      (History of recurrent UTIs [Z87.440])    Surgeons: Lauro Leung MD Provider: Archie Madsen MD    Anesthesia Type: general ASA Status: 4          Anesthesia Type: general    Vitals  Vitals Value Taken Time   /79 11/04/21 1249   Temp 97.3 °F (36.3 °C) 11/04/21 1219   Pulse 84 11/04/21 1249   Resp 14 11/04/21 1249   SpO2 100 % 11/04/21 1249           Post Anesthesia Care and Evaluation    Patient location during evaluation: PACU  Patient participation: complete - patient participated  Level of consciousness: awake  Pain score: 0  Pain management: adequate  Airway patency: patent  Anesthetic complications: No anesthetic complications  PONV Status: none  Cardiovascular status: acceptable  Respiratory status: acceptable  Hydration status: acceptable

## 2021-11-04 NOTE — NURSING NOTE
Dr. Leung notified of patient being unable to urinate. He was also informed that I scanned her bladder twice and obtained a reading of 0mL in the bladder. He said he drained her bladder in the OR and that she can go home but to return to the ED if she is unable to urinate.

## 2021-11-04 NOTE — ANESTHESIA PREPROCEDURE EVALUATION
Anesthesia Evaluation     history of anesthetic complications: PONV  NPO Solid Status: > 8 hours  NPO Liquid Status: > 8 hours           Airway   Mallampati: II  TM distance: >3 FB  Neck ROM: full  No difficulty expected  Dental    (+) edentulous    Pulmonary - normal exam   (+) sleep apnea,   Cardiovascular - normal exam    (+) hypertension, CAD, dysrhythmias Atrial Fib, hyperlipidemia,       Neuro/Psych  GI/Hepatic/Renal/Endo    (+) obesity,   renal disease, diabetes mellitus,     Musculoskeletal     Abdominal  - normal exam   Substance History      OB/GYN          Other      history of cancer active    ROS/Med Hx Other: Endometrial Cancer                  Anesthesia Plan    ASA 4     general     intravenous induction     Anesthetic plan, all risks, benefits, and alternatives have been provided, discussed and informed consent has been obtained with: patient.

## 2021-11-04 NOTE — OP NOTE
CYSTOSCOPY URETERAL CATHETER/STENT INSERTION  Procedure Note    Delmy Bassett  11/4/2021    Pre-op Diagnosis:   Hydronephrosis of left kidney [N13.30]  Endometrial cancer determined by uterine biopsy ([facility]) [C54.1]  Chronic left flank pain [R10.9, G89.29]  History of recurrent UTIs [Z87.440]    Post-op Diagnosis:     Post-Op Diagnosis Codes:     * Hydronephrosis of left kidney [N13.30]     * Endometrial cancer determined by uterine biopsy ([facility]) [C54.1]     * Chronic left flank pain [R10.9, G89.29]     * History of recurrent UTIs [Z87.440]    Procedure/CPT® Codes:      Procedure(s):  CYSTOSCOPY URETERAL CATHETER/STENT INSERTION  B/L Retrograde pyelogram    Surgeon(s):  Lauro Leung MD    Anesthesia: see anesthesia record    Staff:   Circulator: Dereje Venegas RN  Laser Staff: Allegra Oliver LPN  Scrub Person: Ayah Bassett    Estimated Blood Loss: minimal  Urine Voided: * No values recorded between 11/4/2021 11:38 AM and 11/4/2021 12:17 PM *    Specimens:                ID Type Source Tests Collected by Time   1 : Urine C&S from left renal pelvis Urine Kidney, Left URINE CULTURE Lauro Leung MD 11/4/2021 1203   2 : Urine C&S Urine Urine, Catheter URINE CULTURE Lauro Leung MD 11/4/2021 1208         Drains: 7f x 24 cm JJ stent on right    Findings:   1.  Cystoscopy shows no masses or lesions within the walls of the bladder.  Large amount of yeast noted in the urine.  2.  Right retrograde pyelogram without any hydronephrosis or filling defects  3.  Left retrograde pyelogram shows obstruction at the distal ureter with hydroureteronephrosis beyond.  4.  Successful placement of 7 [place] by 24 cm left ureteral stent    Blood: N/A    Complications: None immediate    Grafts and Implants: 7 [place] by 24 double-J ureteral stent    Indication for procedure:   Ms. Bassett is a 71-year-old lady with endometrial cancer who has left-sided hydronephrosis due to extrinsic compression.  She was scheduled today for  left stent placement.    Description of procedure:   Patient was seen and examined in the preoperative area.  The risk benefits and alternatives were explained to the patient and informed consent was obtained.  She was then taken to the operating theater placed on table in the supine position.  Venodyne boots were placed on bilateral lower extremities.  General anesthesia was induced without any complication.  She was prepped and draped in the usual sterile fashion and a timeout was taken.    A 21 Iranian cystoscope was inserted through the urethra into the bladder.  A cystoscopy was performed in a systematic fashion.  A large amount of yeast was noted within the bladder.  This was washed out.  On cystoscopy she did not have any masses or lesions within the walls of the bladder.  Attention was turned to the right ureteral orifice and a 5 Iranian open-ended catheter was intubated into the right UO.    A gentle retrograde pyelogram was performed on the right side which did not reveal any filling defects or hydronephrosis.    Attention was then turned to the left ureteral orifice and a 5 Iranian open-ended catheter was intubated into the left UO.    A retrograde pyelogram was then performed on the left side which showed narrowing of the distal ureter and hydroureteronephrosis beyond.      A 0.035 sensor wire was passed through the open-ended catheter into the left renal pelvis under fluoroscopic guidance.  The open-ended catheter was then advanced beyond the obstruction and into the left renal pelvis.  The renal pelvis was then drained.  A left renal urine culture was also sent.  At this point the wire was replaced and the open-ended catheter was removed.  A 7 Iranian by 24 cm double-J ureteral stent was passed over the wire into the left renal pelvis.  Once the stent was in position the wire was pulled and stent was deployed.    The patient tolerated the procedure well and there were no complications to the procedure.   She is taken to PACU in stable extubated condition.    Disposition:  Patient will be discharged once PACU criteria is met.  She was given fluconazole for 2 weeks.  This was renally dosed.  She was also given a prescription for Pyridium for burning.  I will see her back in approximately 3 months to schedule her next stent exchange.    Lauro Leung MD     Date: 11/4/2021  Time: 12:20 EDT

## 2021-11-04 NOTE — ANESTHESIA PROCEDURE NOTES
Airway  Urgency: elective    Date/Time: 11/4/2021 11:46 AM  Airway not difficult    General Information and Staff    Patient location during procedure: OR  Anesthesiologist: Archie Madsen MD  CRNA: Jael Meyers CRNA    Indications and Patient Condition  Indications for airway management: airway protection    Preoxygenated: yes  Mask difficulty assessment: 0 - not attempted    Final Airway Details  Final airway type: supraglottic airway      Successful airway: classic  Size 4    Number of attempts at approach: 1  Assessment: lips, teeth, and gum same as pre-op    Additional Comments  LMA placed with no trauma noted. Patient tolerated well. Good seal. Secured.

## 2021-11-05 LAB
BACTERIA SPEC AEROBE CULT: ABNORMAL
BACTERIA SPEC AEROBE CULT: NORMAL

## 2021-11-06 LAB
BACTERIA SPEC AEROBE CULT: ABNORMAL
BACTERIA SPEC AEROBE CULT: ABNORMAL

## 2021-11-09 ENCOUNTER — TELEPHONE (OUTPATIENT)
Dept: UROLOGY | Facility: CLINIC | Age: 71
End: 2021-11-09

## 2021-11-09 DIAGNOSIS — N30.00 ACUTE CYSTITIS WITHOUT HEMATURIA: Primary | ICD-10-CM

## 2021-11-09 RX ORDER — CEFDINIR 300 MG/1
300 CAPSULE ORAL DAILY
Qty: 3 CAPSULE | Refills: 0 | Status: SHIPPED | OUTPATIENT
Start: 2021-11-09

## 2021-11-09 NOTE — PROGRESS NOTES
Please call the patient regarding her abnormal result.  I have sent a prescription for macrobid to her pharmacy.  She should finish the fluconazole as well.     Thank you!

## 2021-11-09 NOTE — TELEPHONE ENCOUNTER
I called the pt and left her a vm with the below mess    ----- Message from Lauro Leung MD sent at 11/9/2021 10:24 AM EST -----  Please call the patient regarding her abnormal result.  I have sent a prescription for macrobid to her pharmacy.  She should finish the fluconazole as well.     Thank you!
